# Patient Record
Sex: MALE | Race: OTHER | NOT HISPANIC OR LATINO
[De-identification: names, ages, dates, MRNs, and addresses within clinical notes are randomized per-mention and may not be internally consistent; named-entity substitution may affect disease eponyms.]

---

## 2019-05-08 ENCOUNTER — TRANSCRIPTION ENCOUNTER (OUTPATIENT)
Age: 24
End: 2019-05-08

## 2019-05-08 ENCOUNTER — APPOINTMENT (OUTPATIENT)
Dept: SURGERY | Facility: CLINIC | Age: 24
End: 2019-05-08
Payer: SELF-PAY

## 2019-05-08 ENCOUNTER — LABORATORY RESULT (OUTPATIENT)
Age: 24
End: 2019-05-08

## 2019-05-08 VITALS
HEIGHT: 63 IN | OXYGEN SATURATION: 99 % | SYSTOLIC BLOOD PRESSURE: 128 MMHG | RESPIRATION RATE: 15 BRPM | BODY MASS INDEX: 31.89 KG/M2 | HEART RATE: 71 BPM | DIASTOLIC BLOOD PRESSURE: 83 MMHG | WEIGHT: 180 LBS | TEMPERATURE: 98.2 F

## 2019-05-08 DIAGNOSIS — Z88.9 ALLERGY STATUS TO UNSPECIFIED DRUGS, MEDICAMENTS AND BIOLOGICAL SUBSTANCES: ICD-10-CM

## 2019-05-08 DIAGNOSIS — K64.5 PERIANAL VENOUS THROMBOSIS: ICD-10-CM

## 2019-05-08 DIAGNOSIS — J30.2 OTHER SEASONAL ALLERGIC RHINITIS: ICD-10-CM

## 2019-05-08 DIAGNOSIS — Z83.79 FAMILY HISTORY OF OTHER DISEASES OF THE DIGESTIVE SYSTEM: ICD-10-CM

## 2019-05-08 DIAGNOSIS — Z78.9 OTHER SPECIFIED HEALTH STATUS: ICD-10-CM

## 2019-05-08 PROCEDURE — 17250 CHEM CAUT OF GRANLTJ TISSUE: CPT

## 2019-05-08 PROCEDURE — 99244 OFF/OP CNSLTJ NEW/EST MOD 40: CPT | Mod: 25

## 2019-05-08 PROCEDURE — 46320 REMOVAL OF HEMORRHOID CLOT: CPT

## 2019-05-08 RX ORDER — MULTIVITAMIN
TABLET ORAL
Refills: 0 | Status: ACTIVE | COMMUNITY

## 2019-05-08 RX ORDER — CETIRIZINE HCL 10 MG
10 TABLET ORAL
Refills: 0 | Status: ACTIVE | COMMUNITY

## 2019-05-21 NOTE — PHYSICAL EXAM
[FreeTextEntry1] : This is a 23 year-old well-developed male in no apparent distress.\par \par HEENT normocephalic, anicteric, external ears normal bilaterally, EOMs intact.\par \par Lungs - clear to auscultation bilaterally\par \par Cardiac - regular rate and rhythm.\par \par Abdomen soft, nontender, nondistended, no masses. No hepatosplenomegaly.\par \par No inguinal lymphadenopathy bilaterally.\par \par Examination of the perineum reveals a right anterolateral external thrombosed hemorrhoid. Digital rectal examination reveals normal sphincter tone. \par Anoscopy reveals small, non-inflamed internal hemorrhoids. \par \par Neuro-cranial nerves grossly intact. Normal gait.\par \par Psychiatric-oriented to time place and person. Good understanding of conversation.\par

## 2019-05-21 NOTE — HISTORY OF PRESENT ILLNESS
[FreeTextEntry1] : Ronan is a 24 y/o male here for consultation for an external thrombosed hemorrhoid. The patient reports feeling a perianal lump for about 2 years that got worse about a month ago and associated with pain. No improvement in his pain with use of Prep H and sitz baths. Was diagnosed by his GI doctor with an external thrombosed hemorrhoid. No bleeding. Has 2x/day soft BMs.

## 2019-05-21 NOTE — PROCEDURE
[FreeTextEntry1] : I discussed options of excision of the thrombosed hemorrhoid vs.observation, explained the pain should improve within the next week or so and the clot will eventually resolve, likely within 1-2 months. The pt opted for removal d/t ongoing pain. I discussed details, risks, benefits, alternatives.   \par Under local anesthesia, the right anterolateral external thrombosed hemorrhoid was excised and sent to Pathology as a specimen. The wound was treated with Monsel's solution for hemostasis which was adequate at the end. A dressing was placed. The patient tolerated the procedure well.\par

## 2019-05-21 NOTE — ASSESSMENT
[FreeTextEntry1] : 22 yo male with an external thrombosed hemorrhoid excised in the office today. Instructions for LWC, use of Tylenol as needed for pain control and bowel regimen as needed were given.\par RTO as needed for any wound issues.

## 2022-06-13 NOTE — CONSULT LETTER
Addended by: MILTON BARRETT on: 6/13/2022 08:12 AM     Modules accepted: Orders     [Dear  ___] : Dear  [unfilled], [Consult Letter:] : I had the pleasure of evaluating your patient, [unfilled]. [Please see my note below.] : Please see my note below. [Consult Closing:] : Thank you very much for allowing me to participate in the care of this patient.  If you have any questions, please do not hesitate to contact me. [Sincerely,] : Sincerely, [FreeTextEntry2] : Dr Gerardo Zacarias [FreeTextEntry3] : Ness Aldrich M.D., F.A.C.S., F.LUCINA.S.C.R.S.\par Assistant Professor of Surgery\par [employer] [facility]\par \par

## 2025-05-27 ENCOUNTER — APPOINTMENT (OUTPATIENT)
Dept: INTERNAL MEDICINE | Facility: CLINIC | Age: 30
End: 2025-05-27

## 2025-07-14 ENCOUNTER — APPOINTMENT (OUTPATIENT)
Dept: GASTROENTEROLOGY | Facility: CLINIC | Age: 30
End: 2025-07-14

## 2025-08-14 ENCOUNTER — APPOINTMENT (OUTPATIENT)
Dept: INTERNAL MEDICINE | Facility: CLINIC | Age: 30
End: 2025-08-14

## 2025-08-20 ENCOUNTER — APPOINTMENT (OUTPATIENT)
Dept: INTERNAL MEDICINE | Facility: CLINIC | Age: 30
End: 2025-08-20